# Patient Record
Sex: FEMALE | URBAN - METROPOLITAN AREA
[De-identification: names, ages, dates, MRNs, and addresses within clinical notes are randomized per-mention and may not be internally consistent; named-entity substitution may affect disease eponyms.]

---

## 2017-12-28 ENCOUNTER — APPOINTMENT (RX ONLY)
Dept: URBAN - METROPOLITAN AREA CLINIC 45 | Facility: CLINIC | Age: 67
Setting detail: DERMATOLOGY
End: 2017-12-28

## 2017-12-28 DIAGNOSIS — L85.3 XEROSIS CUTIS: ICD-10-CM

## 2017-12-28 DIAGNOSIS — D22 MELANOCYTIC NEVI: ICD-10-CM

## 2017-12-28 DIAGNOSIS — L64.8 OTHER ANDROGENIC ALOPECIA: ICD-10-CM

## 2017-12-28 PROBLEM — L65.9 NONSCARRING HAIR LOSS, UNSPECIFIED: Status: ACTIVE | Noted: 2017-12-28

## 2017-12-28 PROBLEM — I10 ESSENTIAL (PRIMARY) HYPERTENSION: Status: ACTIVE | Noted: 2017-12-28

## 2017-12-28 PROBLEM — D22.9 MELANOCYTIC NEVI, UNSPECIFIED: Status: ACTIVE | Noted: 2017-12-28

## 2017-12-28 PROCEDURE — 11100: CPT

## 2017-12-28 PROCEDURE — ? BIOPSY BY PUNCH METHOD

## 2017-12-28 PROCEDURE — ? COUNSELING

## 2017-12-28 PROCEDURE — ? TREATMENT REGIMEN

## 2017-12-28 PROCEDURE — 99203 OFFICE O/P NEW LOW 30 MIN: CPT | Mod: 25

## 2017-12-28 ASSESSMENT — LOCATION ZONE DERM: LOCATION ZONE: SCALP

## 2017-12-28 ASSESSMENT — LOCATION DETAILED DESCRIPTION DERM: LOCATION DETAILED: RIGHT CENTRAL FRONTAL SCALP

## 2017-12-28 ASSESSMENT — LOCATION SIMPLE DESCRIPTION DERM: LOCATION SIMPLE: RIGHT SCALP

## 2017-12-28 NOTE — PROCEDURE: BIOPSY BY PUNCH METHOD
Billing Type: Third-Party Bill
Bill For Surgical Tray: no
Deep Sutures: 5-0 Vicryl
Hemostasis: None
Notification Instructions: Patient will be notified of biopsy results. However, patient instructed to call the office if not contacted within 2 weeks.
Anesthesia Type: 1% lidocaine with epinephrine
Anesthesia Volume In Cc (Will Not Render If 0): 0.5
Suture Removal: 7 days
Punch Size In Mm: 3
Biopsy Type: H and E
X Depth Of Punch In Cm (Optional): 0
Detail Level: Detailed
Post-Care Instructions: I reviewed with the patient in detail post-care instructions. Patient is to keep the biopsy site dry overnight, and then apply bacitracin twice daily until healed. Patient may apply hydrogen peroxide soaks to remove any crusting.
Lab Facility: [id number]
Dressing: bandage
Consent: Written consent was obtained and risks were reviewed including but not limited to scarring, infection, bleeding, scabbing, incomplete removal, nerve damage and allergy to anesthesia.
Epidermal Sutures: 5-0 Ethilon
Home Suture Removal Text: Patient was provided a home suture removal kit and will remove their sutures at home.  If they have any questions or difficulties they will call the office.
Lab: [id number]
Wound Care: Bacitracin

## 2017-12-28 NOTE — PROCEDURE: TREATMENT REGIMEN
Plan: Location: scalp\\nTreatment: 3 mm punch bx \\n\\nPt states she has been suffering from extreme hair loss and thinning for the past 2 months\\nDiscussed with pt that today, her scalp looks healthy\\nDiscussed with pt that hair loss may be due to hormonal changes\\nDiscussed with pt that she may also be experiencing more hair loss due to stress-- pt states she noticed her increased hair loss after the death of her mother several weeks ago; pt has also lost a lot of weight after her mother's death\\nWill take punch biopsy today and give pt treatment regimen after receiving path results\\n\\nF/u and suture removal in 2 weeks
Detail Level: Zone

## 2018-01-11 ENCOUNTER — APPOINTMENT (RX ONLY)
Dept: URBAN - METROPOLITAN AREA CLINIC 45 | Facility: CLINIC | Age: 68
Setting detail: DERMATOLOGY
End: 2018-01-11

## 2018-01-11 DIAGNOSIS — L64.8 OTHER ANDROGENIC ALOPECIA: ICD-10-CM

## 2018-01-11 PROCEDURE — ? TREATMENT REGIMEN

## 2018-01-11 PROCEDURE — ? COUNSELING

## 2018-01-11 PROCEDURE — 99213 OFFICE O/P EST LOW 20 MIN: CPT

## 2018-01-11 PROCEDURE — ? PRESCRIPTION

## 2018-01-11 RX ORDER — FINASTERIDE 5 MG/1
TABLET, FILM COATED ORAL
Qty: 30 | Refills: 8 | Status: ERX | COMMUNITY
Start: 2018-01-11

## 2018-01-11 RX ADMIN — FINASTERIDE: 5 TABLET, FILM COATED ORAL at 21:51

## 2018-01-11 ASSESSMENT — LOCATION DETAILED DESCRIPTION DERM: LOCATION DETAILED: RIGHT SUPERIOR PARIETAL SCALP

## 2018-01-11 ASSESSMENT — LOCATION ZONE DERM: LOCATION ZONE: SCALP

## 2018-01-11 ASSESSMENT — LOCATION SIMPLE DESCRIPTION DERM: LOCATION SIMPLE: SCALP

## 2018-01-11 NOTE — PROCEDURE: TREATMENT REGIMEN
Plan: Location : Scalp\\nTreatment : Discussed Finasteride 5mg tablet - take 1/4 tablet daily vs. Spironolactone 100mg tablet po QD\\n\\nDiscussed pathology results with patient. \\nDiscussed with patient that since she is NCBP, we can treat with Finasteride. \\nDiscussed side effects to look out for, reassured patient that this medication will not interact with her osteoporosis or blood pressure medication.\\nDiscussed that it can slightly lower her BP but nothing significant.
Detail Level: Zone